# Patient Record
Sex: FEMALE | Race: WHITE | HISPANIC OR LATINO | URBAN - METROPOLITAN AREA
[De-identification: names, ages, dates, MRNs, and addresses within clinical notes are randomized per-mention and may not be internally consistent; named-entity substitution may affect disease eponyms.]

---

## 2019-01-06 ENCOUNTER — EMERGENCY (EMERGENCY)
Facility: HOSPITAL | Age: 44
LOS: 1 days | Discharge: HOME | End: 2019-01-06
Admitting: EMERGENCY MEDICINE
Payer: MEDICAID

## 2019-01-06 VITALS
DIASTOLIC BLOOD PRESSURE: 85 MMHG | RESPIRATION RATE: 17 BRPM | OXYGEN SATURATION: 98 % | WEIGHT: 179.9 LBS | TEMPERATURE: 98 F | HEART RATE: 67 BPM | SYSTOLIC BLOOD PRESSURE: 150 MMHG

## 2019-01-06 DIAGNOSIS — J44.9 CHRONIC OBSTRUCTIVE PULMONARY DISEASE, UNSPECIFIED: ICD-10-CM

## 2019-01-06 DIAGNOSIS — R10.9 UNSPECIFIED ABDOMINAL PAIN: ICD-10-CM

## 2019-01-06 DIAGNOSIS — N20.0 CALCULUS OF KIDNEY: ICD-10-CM

## 2019-01-06 DIAGNOSIS — Z98.84 BARIATRIC SURGERY STATUS: Chronic | ICD-10-CM

## 2019-01-06 LAB
ALBUMIN SERPL ELPH-MCNC: 3.9 G/DL — SIGNIFICANT CHANGE UP (ref 3.4–5)
ALP SERPL-CCNC: 89 U/L — SIGNIFICANT CHANGE UP (ref 40–120)
ALT FLD-CCNC: 22 U/L — SIGNIFICANT CHANGE UP (ref 12–42)
ANION GAP SERPL CALC-SCNC: 11 MMOL/L — SIGNIFICANT CHANGE UP (ref 9–16)
APPEARANCE UR: CLEAR — SIGNIFICANT CHANGE UP
AST SERPL-CCNC: 21 U/L — SIGNIFICANT CHANGE UP (ref 15–37)
BILIRUB SERPL-MCNC: 0.4 MG/DL — SIGNIFICANT CHANGE UP (ref 0.2–1.2)
BILIRUB UR-MCNC: NEGATIVE — SIGNIFICANT CHANGE UP
BUN SERPL-MCNC: 13 MG/DL — SIGNIFICANT CHANGE UP (ref 7–23)
CALCIUM SERPL-MCNC: 9.1 MG/DL — SIGNIFICANT CHANGE UP (ref 8.5–10.5)
CHLORIDE SERPL-SCNC: 104 MMOL/L — SIGNIFICANT CHANGE UP (ref 96–108)
CO2 SERPL-SCNC: 24 MMOL/L — SIGNIFICANT CHANGE UP (ref 22–31)
COLOR SPEC: YELLOW — SIGNIFICANT CHANGE UP
CREAT SERPL-MCNC: 0.78 MG/DL — SIGNIFICANT CHANGE UP (ref 0.5–1.3)
DIFF PNL FLD: ABNORMAL
GLUCOSE SERPL-MCNC: 120 MG/DL — HIGH (ref 70–99)
GLUCOSE UR QL: NEGATIVE — SIGNIFICANT CHANGE UP
HCG UR QL: NEGATIVE — SIGNIFICANT CHANGE UP
HCT VFR BLD CALC: 39 % — SIGNIFICANT CHANGE UP (ref 34.5–45)
HGB BLD-MCNC: 13.7 G/DL — SIGNIFICANT CHANGE UP (ref 11.5–15.5)
KETONES UR-MCNC: ABNORMAL MG/DL
LEUKOCYTE ESTERASE UR-ACNC: ABNORMAL
LIDOCAIN IGE QN: 83 U/L — SIGNIFICANT CHANGE UP (ref 73–393)
MCHC RBC-ENTMCNC: 30.4 PG — SIGNIFICANT CHANGE UP (ref 27–34)
MCHC RBC-ENTMCNC: 35.1 G/DL — SIGNIFICANT CHANGE UP (ref 32–36)
MCV RBC AUTO: 86.5 FL — SIGNIFICANT CHANGE UP (ref 80–100)
NITRITE UR-MCNC: NEGATIVE — SIGNIFICANT CHANGE UP
PH UR: 8 — SIGNIFICANT CHANGE UP (ref 5–8)
PLATELET # BLD AUTO: 410 K/UL — HIGH (ref 150–400)
POTASSIUM SERPL-MCNC: 3.9 MMOL/L — SIGNIFICANT CHANGE UP (ref 3.5–5.3)
POTASSIUM SERPL-SCNC: 3.9 MMOL/L — SIGNIFICANT CHANGE UP (ref 3.5–5.3)
PROT SERPL-MCNC: 7.9 G/DL — SIGNIFICANT CHANGE UP (ref 6.4–8.2)
PROT UR-MCNC: 30 MG/DL
RBC # BLD: 4.51 M/UL — SIGNIFICANT CHANGE UP (ref 3.8–5.2)
RBC # FLD: 12 % — SIGNIFICANT CHANGE UP (ref 10.3–14.5)
SODIUM SERPL-SCNC: 139 MMOL/L — SIGNIFICANT CHANGE UP (ref 132–145)
SP GR SPEC: 1.02 — SIGNIFICANT CHANGE UP (ref 1–1.03)
UROBILINOGEN FLD QL: 1 E.U./DL — SIGNIFICANT CHANGE UP
WBC # BLD: 14.6 K/UL — HIGH (ref 3.8–10.5)
WBC # FLD AUTO: 14.6 K/UL — HIGH (ref 3.8–10.5)

## 2019-01-06 PROCEDURE — 99285 EMERGENCY DEPT VISIT HI MDM: CPT

## 2019-01-06 PROCEDURE — 74176 CT ABD & PELVIS W/O CONTRAST: CPT | Mod: 26

## 2019-01-06 RX ORDER — OXYCODONE AND ACETAMINOPHEN 5; 325 MG/1; MG/1
1 TABLET ORAL ONCE
Qty: 0 | Refills: 0 | Status: COMPLETED | OUTPATIENT
Start: 2019-01-06 | End: 2019-01-06

## 2019-01-06 RX ORDER — IBUPROFEN 200 MG
1 TABLET ORAL
Qty: 20 | Refills: 0 | OUTPATIENT
Start: 2019-01-06 | End: 2019-01-10

## 2019-01-06 RX ORDER — SODIUM CHLORIDE 9 MG/ML
1000 INJECTION INTRAMUSCULAR; INTRAVENOUS; SUBCUTANEOUS ONCE
Qty: 0 | Refills: 0 | Status: COMPLETED | OUTPATIENT
Start: 2019-01-06 | End: 2019-01-06

## 2019-01-06 RX ORDER — ONDANSETRON 8 MG/1
4 TABLET, FILM COATED ORAL ONCE
Qty: 0 | Refills: 0 | Status: COMPLETED | OUTPATIENT
Start: 2019-01-06 | End: 2019-01-06

## 2019-01-06 RX ORDER — KETOROLAC TROMETHAMINE 30 MG/ML
30 SYRINGE (ML) INJECTION ONCE
Qty: 0 | Refills: 0 | Status: DISCONTINUED | OUTPATIENT
Start: 2019-01-06 | End: 2019-01-06

## 2019-01-06 RX ORDER — MORPHINE SULFATE 50 MG/1
4 CAPSULE, EXTENDED RELEASE ORAL ONCE
Qty: 0 | Refills: 0 | Status: DISCONTINUED | OUTPATIENT
Start: 2019-01-06 | End: 2019-01-06

## 2019-01-06 RX ORDER — TAMSULOSIN HYDROCHLORIDE 0.4 MG/1
1 CAPSULE ORAL
Qty: 14 | Refills: 0 | OUTPATIENT
Start: 2019-01-06 | End: 2019-01-19

## 2019-01-06 RX ORDER — SODIUM CHLORIDE 9 MG/ML
3 INJECTION INTRAMUSCULAR; INTRAVENOUS; SUBCUTANEOUS ONCE
Qty: 0 | Refills: 0 | Status: COMPLETED | OUTPATIENT
Start: 2019-01-06 | End: 2019-01-06

## 2019-01-06 RX ORDER — ONDANSETRON 8 MG/1
1 TABLET, FILM COATED ORAL
Qty: 12 | Refills: 0 | OUTPATIENT
Start: 2019-01-06

## 2019-01-06 RX ORDER — TAMSULOSIN HYDROCHLORIDE 0.4 MG/1
0.4 CAPSULE ORAL ONCE
Qty: 0 | Refills: 0 | Status: COMPLETED | OUTPATIENT
Start: 2019-01-06 | End: 2019-01-06

## 2019-01-06 RX ADMIN — MORPHINE SULFATE 4 MILLIGRAM(S): 50 CAPSULE, EXTENDED RELEASE ORAL at 17:54

## 2019-01-06 RX ADMIN — SODIUM CHLORIDE 3 MILLILITER(S): 9 INJECTION INTRAMUSCULAR; INTRAVENOUS; SUBCUTANEOUS at 17:40

## 2019-01-06 RX ADMIN — SODIUM CHLORIDE 1000 MILLILITER(S): 9 INJECTION INTRAMUSCULAR; INTRAVENOUS; SUBCUTANEOUS at 17:45

## 2019-01-06 RX ADMIN — Medication 30 MILLIGRAM(S): at 17:45

## 2019-01-06 RX ADMIN — ONDANSETRON 4 MILLIGRAM(S): 8 TABLET, FILM COATED ORAL at 17:45

## 2019-01-06 RX ADMIN — TAMSULOSIN HYDROCHLORIDE 0.4 MILLIGRAM(S): 0.4 CAPSULE ORAL at 19:35

## 2019-01-06 NOTE — ED ADULT NURSE NOTE - OBJECTIVE STATEMENT
c/o sudden onset right sided flank pain while at restaurant, + radiation to RLQ, + n/v. Pt states hx of similar r/t kidnet stones, last episode approx 8 years ago> pt upgraded r/t pain, brought directly to room 7. Provider at bedside, medicated as ordered, labs sent. Multiple family members at bedside, will f/u as indicated. c/o sudden onset right sided flank pain while at restaurant, + radiation to RLQ, + n/v. Pt states hx of similar r/t kidney stones, last episode approx 8 years ago> pt upgraded r/t pain, brought directly to room 7. Provider at bedside, medicated as ordered, labs sent. Multiple family members at bedside, will f/u as indicated.

## 2019-01-06 NOTE — ED PROVIDER NOTE - MEDICAL DECISION MAKING DETAILS
44 y/o F presents to ED c/o R flank pain.  Pt appears uncomfortable.  VSS. NAD.  CT shows 4 mm stone with mild R hydronephrosis.  Nl creatinine, UA negative for infection, culture sent.  Will refer to urology.   Rx for flomax, Percocet, ibuprofen, Zofran and given urine strainers.  Strict return precautions advised.

## 2019-01-06 NOTE — ED PROVIDER NOTE - ENMT, MLM
Normocephalic, atraumatic. Moist mucous membranes. Airway patent, Nasal mucosa clear. Mouth with normal mucosa. Throat has no vesicles, no oropharyngeal exudates and uvula is midline.

## 2019-01-06 NOTE — ED ADULT NURSE NOTE - NSIMPLEMENTINTERV_GEN_ALL_ED
Implemented All Universal Safety Interventions:  [place] to call system. Call bell, personal items and telephone within reach. Instruct patient to call for assistance. Room bathroom lighting operational. Non-slip footwear when patient is off stretcher. Physically safe environment: no spills, clutter or unnecessary equipment. Stretcher in lowest position, wheels locked, appropriate side rails in place.

## 2019-01-06 NOTE — ED ADULT NURSE NOTE - CHPI ED NUR SYMPTOMS NEG
no burning urination/no chills/no diarrhea/no fever/no blood in stool/no abdominal distension/no dysuria

## 2019-01-06 NOTE — ED PROVIDER NOTE - NSFOLLOWUPINSTRUCTIONS_ED_ALL_ED_FT
Take Flomax daily.  Take Ibuprofen asneeded with food for moderate pain.  Take Percocet as needed with food for severe pain.  This medication is sedating.  Do not drive or drink alcohol with this medication.  Hydrate well.  Follow up with urology.   Return for fever, vomiting, intractable pain, inability to urinate or other concerns.

## 2019-01-06 NOTE — ED PROVIDER NOTE - OBJECTIVE STATEMENT
42 y/o F with PMHx of kidney stones and COPD, PSHx of gastric bypass (April 2018) presents to the ED c/o sudden onset R flank pain with radiation to the R lower abdomen associated nausea x 1 hr. States pain is similar to kidney stone pain in the past and has never required lithotripsy or a ureteral stent. Last kidney stone occurred 8 yrs ago. Pt denies fevers, chills, vomiting, dysuria, hematuria, CP, SOB and chance of pregnancy.

## 2019-01-06 NOTE — ED ADULT TRIAGE NOTE - CHIEF COMPLAINT QUOTE
here for acute right sided flank pain radiating to right lower abdomen- denies urinary symptoms- Pt with h/o kidney stones

## 2019-01-06 NOTE — ED PROVIDER NOTE - CARE PROVIDER_API CALL
Patrick Rosa), Urology  [address] [place] [address], [place] [id number]  Phone: [phone]  Fax: [phone]

## 2019-01-13 ENCOUNTER — INPATIENT (INPATIENT)
Facility: HOSPITAL | Age: 44
LOS: 0 days | Discharge: HOME | DRG: 694 | End: 2019-01-14
Attending: UROLOGY | Admitting: UROLOGY
Payer: COMMERCIAL

## 2019-01-13 VITALS
OXYGEN SATURATION: 98 % | RESPIRATION RATE: 17 BRPM | HEART RATE: 73 BPM | DIASTOLIC BLOOD PRESSURE: 86 MMHG | TEMPERATURE: 98 F | SYSTOLIC BLOOD PRESSURE: 145 MMHG

## 2019-01-13 DIAGNOSIS — N20.0 CALCULUS OF KIDNEY: ICD-10-CM

## 2019-01-13 DIAGNOSIS — Z98.84 BARIATRIC SURGERY STATUS: Chronic | ICD-10-CM

## 2019-01-13 PROBLEM — J44.9 CHRONIC OBSTRUCTIVE PULMONARY DISEASE, UNSPECIFIED: Chronic | Status: ACTIVE | Noted: 2019-01-06

## 2019-01-13 LAB
ALBUMIN SERPL ELPH-MCNC: 3.8 G/DL — SIGNIFICANT CHANGE UP (ref 3.4–5)
ALP SERPL-CCNC: 77 U/L — SIGNIFICANT CHANGE UP (ref 40–120)
ALT FLD-CCNC: 20 U/L — SIGNIFICANT CHANGE UP (ref 12–42)
ANION GAP SERPL CALC-SCNC: 12 MMOL/L — SIGNIFICANT CHANGE UP (ref 9–16)
APPEARANCE UR: CLEAR — SIGNIFICANT CHANGE UP
AST SERPL-CCNC: 18 U/L — SIGNIFICANT CHANGE UP (ref 15–37)
BASOPHILS NFR BLD AUTO: 0.4 % — SIGNIFICANT CHANGE UP (ref 0–2)
BILIRUB SERPL-MCNC: 0.2 MG/DL — SIGNIFICANT CHANGE UP (ref 0.2–1.2)
BILIRUB UR-MCNC: NEGATIVE — SIGNIFICANT CHANGE UP
BLD GP AB SCN SERPL QL: NEGATIVE — SIGNIFICANT CHANGE UP
BUN SERPL-MCNC: 13 MG/DL — SIGNIFICANT CHANGE UP (ref 7–23)
CALCIUM SERPL-MCNC: 8.8 MG/DL — SIGNIFICANT CHANGE UP (ref 8.5–10.5)
CHLORIDE SERPL-SCNC: 104 MMOL/L — SIGNIFICANT CHANGE UP (ref 96–108)
CO2 SERPL-SCNC: 25 MMOL/L — SIGNIFICANT CHANGE UP (ref 22–31)
COLOR SPEC: YELLOW — SIGNIFICANT CHANGE UP
CREAT SERPL-MCNC: 0.91 MG/DL — SIGNIFICANT CHANGE UP (ref 0.5–1.3)
DIFF PNL FLD: NEGATIVE — SIGNIFICANT CHANGE UP
EOSINOPHIL NFR BLD AUTO: 2.8 % — SIGNIFICANT CHANGE UP (ref 0–6)
GLUCOSE SERPL-MCNC: 114 MG/DL — HIGH (ref 70–99)
GLUCOSE UR QL: NEGATIVE — SIGNIFICANT CHANGE UP
HCT VFR BLD CALC: 37.1 % — SIGNIFICANT CHANGE UP (ref 34.5–45)
HGB BLD-MCNC: 12.7 G/DL — SIGNIFICANT CHANGE UP (ref 11.5–15.5)
IMM GRANULOCYTES NFR BLD AUTO: 0.4 % — SIGNIFICANT CHANGE UP (ref 0–1.5)
KETONES UR-MCNC: NEGATIVE — SIGNIFICANT CHANGE UP
LEUKOCYTE ESTERASE UR-ACNC: ABNORMAL
LYMPHOCYTES # BLD AUTO: 10.8 % — LOW (ref 13–44)
MCHC RBC-ENTMCNC: 30.1 PG — SIGNIFICANT CHANGE UP (ref 27–34)
MCHC RBC-ENTMCNC: 34.2 G/DL — SIGNIFICANT CHANGE UP (ref 32–36)
MCV RBC AUTO: 87.9 FL — SIGNIFICANT CHANGE UP (ref 80–100)
MONOCYTES NFR BLD AUTO: 6.8 % — SIGNIFICANT CHANGE UP (ref 2–14)
NEUTROPHILS NFR BLD AUTO: 78.8 % — HIGH (ref 43–77)
NITRITE UR-MCNC: NEGATIVE — SIGNIFICANT CHANGE UP
PH UR: 6 — SIGNIFICANT CHANGE UP (ref 5–8)
PLATELET # BLD AUTO: 381 K/UL — SIGNIFICANT CHANGE UP (ref 150–400)
POTASSIUM SERPL-MCNC: 3.5 MMOL/L — SIGNIFICANT CHANGE UP (ref 3.5–5.3)
POTASSIUM SERPL-SCNC: 3.5 MMOL/L — SIGNIFICANT CHANGE UP (ref 3.5–5.3)
PROT SERPL-MCNC: 7.8 G/DL — SIGNIFICANT CHANGE UP (ref 6.4–8.2)
PROT UR-MCNC: NEGATIVE MG/DL — SIGNIFICANT CHANGE UP
RBC # BLD: 4.22 M/UL — SIGNIFICANT CHANGE UP (ref 3.8–5.2)
RBC # FLD: 12.3 % — SIGNIFICANT CHANGE UP (ref 10.3–14.5)
RH IG SCN BLD-IMP: POSITIVE — SIGNIFICANT CHANGE UP
SODIUM SERPL-SCNC: 141 MMOL/L — SIGNIFICANT CHANGE UP (ref 132–145)
SP GR SPEC: 1.02 — SIGNIFICANT CHANGE UP (ref 1–1.03)
UROBILINOGEN FLD QL: 1 E.U./DL — SIGNIFICANT CHANGE UP
WBC # BLD: 16.5 K/UL — HIGH (ref 3.8–10.5)
WBC # FLD AUTO: 16.5 K/UL — HIGH (ref 3.8–10.5)

## 2019-01-13 PROCEDURE — 71045 X-RAY EXAM CHEST 1 VIEW: CPT | Mod: 26

## 2019-01-13 PROCEDURE — 99285 EMERGENCY DEPT VISIT HI MDM: CPT | Mod: 25

## 2019-01-13 PROCEDURE — 76770 US EXAM ABDO BACK WALL COMP: CPT | Mod: 26

## 2019-01-13 RX ORDER — SODIUM CHLORIDE 9 MG/ML
1000 INJECTION INTRAMUSCULAR; INTRAVENOUS; SUBCUTANEOUS ONCE
Qty: 0 | Refills: 0 | Status: COMPLETED | OUTPATIENT
Start: 2019-01-13 | End: 2019-01-13

## 2019-01-13 RX ORDER — OXYCODONE AND ACETAMINOPHEN 5; 325 MG/1; MG/1
1 TABLET ORAL EVERY 4 HOURS
Qty: 0 | Refills: 0 | Status: DISCONTINUED | OUTPATIENT
Start: 2019-01-13 | End: 2019-01-14

## 2019-01-13 RX ORDER — MORPHINE SULFATE 50 MG/1
2 CAPSULE, EXTENDED RELEASE ORAL EVERY 4 HOURS
Qty: 0 | Refills: 0 | Status: DISCONTINUED | OUTPATIENT
Start: 2019-01-13 | End: 2019-01-14

## 2019-01-13 RX ORDER — TIOTROPIUM BROMIDE 18 UG/1
2.5 CAPSULE ORAL; RESPIRATORY (INHALATION)
Qty: 0 | Refills: 0 | COMMUNITY

## 2019-01-13 RX ORDER — BUDESONIDE AND FORMOTEROL FUMARATE DIHYDRATE 160; 4.5 UG/1; UG/1
2 AEROSOL RESPIRATORY (INHALATION)
Qty: 0 | Refills: 0 | COMMUNITY

## 2019-01-13 RX ORDER — ASCORBIC ACID 60 MG
1 TABLET,CHEWABLE ORAL
Qty: 0 | Refills: 0 | COMMUNITY

## 2019-01-13 RX ORDER — KETOROLAC TROMETHAMINE 30 MG/ML
30 SYRINGE (ML) INJECTION ONCE
Qty: 0 | Refills: 0 | Status: DISCONTINUED | OUTPATIENT
Start: 2019-01-13 | End: 2019-01-13

## 2019-01-13 RX ORDER — ASCORBIC ACID 60 MG
500 TABLET,CHEWABLE ORAL DAILY
Qty: 0 | Refills: 0 | Status: DISCONTINUED | OUTPATIENT
Start: 2019-01-13 | End: 2019-01-14

## 2019-01-13 RX ORDER — SODIUM CHLORIDE 9 MG/ML
1000 INJECTION INTRAMUSCULAR; INTRAVENOUS; SUBCUTANEOUS
Qty: 0 | Refills: 0 | Status: DISCONTINUED | OUTPATIENT
Start: 2019-01-13 | End: 2019-01-14

## 2019-01-13 RX ORDER — KETOROLAC TROMETHAMINE 30 MG/ML
30 SYRINGE (ML) INJECTION EVERY 8 HOURS
Qty: 0 | Refills: 0 | Status: DISCONTINUED | OUTPATIENT
Start: 2019-01-13 | End: 2019-01-14

## 2019-01-13 RX ORDER — MORPHINE SULFATE 50 MG/1
4 CAPSULE, EXTENDED RELEASE ORAL ONCE
Qty: 0 | Refills: 0 | Status: DISCONTINUED | OUTPATIENT
Start: 2019-01-13 | End: 2019-01-13

## 2019-01-13 RX ORDER — ONDANSETRON 8 MG/1
4 TABLET, FILM COATED ORAL ONCE
Qty: 0 | Refills: 0 | Status: COMPLETED | OUTPATIENT
Start: 2019-01-13 | End: 2019-01-13

## 2019-01-13 RX ORDER — OXYCODONE AND ACETAMINOPHEN 5; 325 MG/1; MG/1
2 TABLET ORAL EVERY 6 HOURS
Qty: 0 | Refills: 0 | Status: DISCONTINUED | OUTPATIENT
Start: 2019-01-13 | End: 2019-01-14

## 2019-01-13 RX ORDER — BUDESONIDE AND FORMOTEROL FUMARATE DIHYDRATE 160; 4.5 UG/1; UG/1
2 AEROSOL RESPIRATORY (INHALATION)
Qty: 0 | Refills: 0 | Status: DISCONTINUED | OUTPATIENT
Start: 2019-01-13 | End: 2019-01-14

## 2019-01-13 RX ORDER — KETOROLAC TROMETHAMINE 30 MG/ML
30 SYRINGE (ML) INJECTION EVERY 8 HOURS
Qty: 0 | Refills: 0 | Status: DISCONTINUED | OUTPATIENT
Start: 2019-01-13 | End: 2019-01-13

## 2019-01-13 RX ORDER — TIOTROPIUM BROMIDE 18 UG/1
1 CAPSULE ORAL; RESPIRATORY (INHALATION) AT BEDTIME
Qty: 0 | Refills: 0 | Status: DISCONTINUED | OUTPATIENT
Start: 2019-01-13 | End: 2019-01-14

## 2019-01-13 RX ORDER — TAMSULOSIN HYDROCHLORIDE 0.4 MG/1
0.4 CAPSULE ORAL AT BEDTIME
Qty: 0 | Refills: 0 | Status: DISCONTINUED | OUTPATIENT
Start: 2019-01-13 | End: 2019-01-14

## 2019-01-13 RX ORDER — CEFTRIAXONE 500 MG/1
1 INJECTION, POWDER, FOR SOLUTION INTRAMUSCULAR; INTRAVENOUS ONCE
Qty: 0 | Refills: 0 | Status: COMPLETED | OUTPATIENT
Start: 2019-01-13 | End: 2019-01-13

## 2019-01-13 RX ORDER — ALBUTEROL 90 UG/1
1 AEROSOL, METERED ORAL EVERY 6 HOURS
Qty: 0 | Refills: 0 | Status: DISCONTINUED | OUTPATIENT
Start: 2019-01-13 | End: 2019-01-14

## 2019-01-13 RX ADMIN — MORPHINE SULFATE 4 MILLIGRAM(S): 50 CAPSULE, EXTENDED RELEASE ORAL at 04:45

## 2019-01-13 RX ADMIN — MORPHINE SULFATE 4 MILLIGRAM(S): 50 CAPSULE, EXTENDED RELEASE ORAL at 10:49

## 2019-01-13 RX ADMIN — BUDESONIDE AND FORMOTEROL FUMARATE DIHYDRATE 2 PUFF(S): 160; 4.5 AEROSOL RESPIRATORY (INHALATION) at 17:57

## 2019-01-13 RX ADMIN — OXYCODONE AND ACETAMINOPHEN 1 TABLET(S): 5; 325 TABLET ORAL at 18:56

## 2019-01-13 RX ADMIN — Medication 30 MILLIGRAM(S): at 10:48

## 2019-01-13 RX ADMIN — Medication 30 MILLIGRAM(S): at 05:33

## 2019-01-13 RX ADMIN — CEFTRIAXONE 100 GRAM(S): 500 INJECTION, POWDER, FOR SOLUTION INTRAMUSCULAR; INTRAVENOUS at 09:41

## 2019-01-13 RX ADMIN — MORPHINE SULFATE 4 MILLIGRAM(S): 50 CAPSULE, EXTENDED RELEASE ORAL at 04:29

## 2019-01-13 RX ADMIN — ONDANSETRON 4 MILLIGRAM(S): 8 TABLET, FILM COATED ORAL at 04:29

## 2019-01-13 RX ADMIN — SODIUM CHLORIDE 1000 MILLILITER(S): 9 INJECTION INTRAMUSCULAR; INTRAVENOUS; SUBCUTANEOUS at 04:29

## 2019-01-13 RX ADMIN — TAMSULOSIN HYDROCHLORIDE 0.4 MILLIGRAM(S): 0.4 CAPSULE ORAL at 21:07

## 2019-01-13 RX ADMIN — TIOTROPIUM BROMIDE 1 CAPSULE(S): 18 CAPSULE ORAL; RESPIRATORY (INHALATION) at 21:08

## 2019-01-13 RX ADMIN — OXYCODONE AND ACETAMINOPHEN 1 TABLET(S): 5; 325 TABLET ORAL at 17:56

## 2019-01-13 NOTE — ED ADULT TRIAGE NOTE - CHIEF COMPLAINT QUOTE
ambulatory, complaining of right sided flank pain. States she was here a week ago and was diagnosed with kidney stones. Pt vomited on arrival.

## 2019-01-13 NOTE — H&P ADULT - NSHPPHYSICALEXAM_GEN_ALL_CORE
PE  Gen:  Abd:  : PE  Gen: awake and alert, NAD  Abd: soft, nontender, nondistended, R CVA tenderness  : voiding well

## 2019-01-13 NOTE — ED PROVIDER NOTE - OBJECTIVE STATEMENT
42 y/o F presents to returns to ED c/o worsening R flank pain.  Pt seen in ED 6 days ago and diagnosed with 6 mm R ureteral stone.  Pt discharged with Percocet, Flomax and Zofran.  She states she has been trying to manage the pain but it has increased within the past 24 hours.  She denies fevers/chills, abd pain, n/v/d, chest pain, SOB.

## 2019-01-13 NOTE — ED ADULT NURSE NOTE - OBJECTIVE STATEMENT
pt aox4. neurologically wnl. no sob or difficulty breathing noted. lung sounds clear bilaterally. skin appropriate for ethnicity, warm and dry. cap refill < 2 secs. pulses 2+ and regular. abd rounded soft and nontender. pt c/o R flank pain. pt with recent diagnosis of R kidney stone. pt states that pain has increased and gotten worse.

## 2019-01-13 NOTE — H&P ADULT - PROBLEM SELECTOR PLAN 1
-Admit to urology  -IVF  -Preop for OR tomorrow  -Pain control  -Strain urine  -npo after mid  -discussed with urology team

## 2019-01-13 NOTE — ED PROVIDER NOTE - PROGRESS NOTE DETAILS
urology paged, awaiting call back Pt discussed with urology Aminta CONTI.  Advised Toradol IV and if pt is still very uncomfortable, will call urology attending directly for admission. The patient understands Emergency Department diagnosis is a preliminary diagnosis often based on limited information and that the patient must adhere to the follow-up plan as discussed.  The patient understands that if the symptoms worsen or if prescribed medications do not have the desired/planned effect that the patient may return to the Emergency Department at any time for further evaluation and treatment.

## 2019-01-13 NOTE — H&P ADULT - HISTORY OF PRESENT ILLNESS
Pt is a 44 yo female with PMH significant for COPD, kidney stones.  She presented to [facility] overnight with worsening pain.  She was previously seen in the ED 6 days ago with pain and was found to have a 6mm R ureteral stone and was sent home with percocet, flomax and zofran.  She denies fevers, chills, nausea, vomiting or diarrhea.  She was transferred to [facility] for pain control. Pt is a 42 yo female with PMH significant for COPD, kidney stones.  She presented to [facility] overnight with worsening pain.  She was previously seen in the ED 6 days ago with pain and was found to have a 6mm R ureteral stone and was sent home with percocet, flomax and zofran.  She denies fevers, chills, nausea, vomiting or diarrhea.  No dysuria, no hematuria, no increased urinary frequency.  She was transferred to [facility] for pain control.

## 2019-01-13 NOTE — ED ADULT NURSE NOTE - NSIMPLEMENTINTERV_GEN_ALL_ED
Implemented All Universal Safety Interventions:  [place] to call system. Call bell, personal items and telephone within reach. Instruct patient to call for assistance. Room bathroom lighting operational. Non-slip footwear when patient is off stretcher. Physically safe environment: no spills, clutter or unnecessary equipment. Stretcher in lowest position, wheels locked, appropriate side rails in place.

## 2019-01-13 NOTE — ED PROVIDER NOTE - MEDICAL DECISION MAKING DETAILS
44 y/o F with known proximal R ureteral stone on CT 6 days ago, presents to ED c/o intractable pain.  VSS, afebrile.  Labs wnl.  Nl creatinine.  No infection on UA.

## 2019-01-13 NOTE — ED PROVIDER NOTE - CONDUCTED A DETAILED DISCUSSION WITH PATIENT AND/OR GUARDIAN REGARDING, MDM
need for outpatient follow-up/return to ED if symptoms worsen, persist or questions arise need for outpatient follow-up/return to ED if symptoms worsen, persist or questions arise/lab results

## 2019-01-13 NOTE — ED PROVIDER NOTE - ATTENDING CONTRIBUTION TO CARE
Patient presenting with wrose R flank pain. Know 4mm prox ureter stone. Dx'd here last week. VSS. ++ pian. + R CVAT. Will send labs and UA. May need admission.

## 2019-01-13 NOTE — H&P ADULT - ASSESSMENT
Pt is a 42 yo female transferred from [facility] with 6mm R proximal ureteral stone.  Vital signs stable, Afebrile. She is schedule for OR tomorrow 1/14 for cysto, R urs, R stent placement.

## 2019-01-14 ENCOUNTER — TRANSCRIPTION ENCOUNTER (OUTPATIENT)
Age: 44
End: 2019-01-14

## 2019-01-14 VITALS
OXYGEN SATURATION: 96 % | DIASTOLIC BLOOD PRESSURE: 71 MMHG | TEMPERATURE: 99 F | HEART RATE: 72 BPM | SYSTOLIC BLOOD PRESSURE: 143 MMHG | RESPIRATION RATE: 18 BRPM

## 2019-01-14 LAB
ANION GAP SERPL CALC-SCNC: 13 MMOL/L — SIGNIFICANT CHANGE UP (ref 5–17)
APTT BLD: 31.7 SEC — SIGNIFICANT CHANGE UP (ref 27.5–36.3)
BASOPHILS NFR BLD AUTO: 0.4 % — SIGNIFICANT CHANGE UP (ref 0–2)
BUN SERPL-MCNC: 10 MG/DL — SIGNIFICANT CHANGE UP (ref 7–23)
CALCIUM SERPL-MCNC: 8.4 MG/DL — SIGNIFICANT CHANGE UP (ref 8.4–10.5)
CHLORIDE SERPL-SCNC: 101 MMOL/L — SIGNIFICANT CHANGE UP (ref 96–108)
CO2 SERPL-SCNC: 23 MMOL/L — SIGNIFICANT CHANGE UP (ref 22–31)
CREAT SERPL-MCNC: 0.61 MG/DL — SIGNIFICANT CHANGE UP (ref 0.5–1.3)
EOSINOPHIL NFR BLD AUTO: 5.5 % — SIGNIFICANT CHANGE UP (ref 0–6)
GLUCOSE SERPL-MCNC: 86 MG/DL — SIGNIFICANT CHANGE UP (ref 70–99)
HCG SERPL-ACNC: <.1 MIU/ML — SIGNIFICANT CHANGE UP
HCT VFR BLD CALC: 36.1 % — SIGNIFICANT CHANGE UP (ref 34.5–45)
HGB BLD-MCNC: 12.5 G/DL — SIGNIFICANT CHANGE UP (ref 11.5–15.5)
INR BLD: 1.02 — SIGNIFICANT CHANGE UP (ref 0.88–1.16)
LYMPHOCYTES # BLD AUTO: 23.2 % — SIGNIFICANT CHANGE UP (ref 13–44)
MAGNESIUM SERPL-MCNC: 1.8 MG/DL — SIGNIFICANT CHANGE UP (ref 1.6–2.6)
MCHC RBC-ENTMCNC: 29.9 PG — SIGNIFICANT CHANGE UP (ref 27–34)
MCHC RBC-ENTMCNC: 34.6 G/DL — SIGNIFICANT CHANGE UP (ref 32–36)
MCV RBC AUTO: 86.4 FL — SIGNIFICANT CHANGE UP (ref 80–100)
MONOCYTES NFR BLD AUTO: 6.9 % — SIGNIFICANT CHANGE UP (ref 2–14)
NEUTROPHILS NFR BLD AUTO: 64 % — SIGNIFICANT CHANGE UP (ref 43–77)
PHOSPHATE SERPL-MCNC: 3.2 MG/DL — SIGNIFICANT CHANGE UP (ref 2.5–4.5)
PLATELET # BLD AUTO: 298 K/UL — SIGNIFICANT CHANGE UP (ref 150–400)
POTASSIUM SERPL-MCNC: 3.6 MMOL/L — SIGNIFICANT CHANGE UP (ref 3.5–5.3)
POTASSIUM SERPL-SCNC: 3.6 MMOL/L — SIGNIFICANT CHANGE UP (ref 3.5–5.3)
PROTHROM AB SERPL-ACNC: 11.5 SEC — SIGNIFICANT CHANGE UP (ref 10–12.9)
RBC # BLD: 4.18 M/UL — SIGNIFICANT CHANGE UP (ref 3.8–5.2)
RBC # FLD: 12.3 % — SIGNIFICANT CHANGE UP (ref 10.3–16.9)
SODIUM SERPL-SCNC: 137 MMOL/L — SIGNIFICANT CHANGE UP (ref 135–145)
WBC # BLD: 7.5 K/UL — SIGNIFICANT CHANGE UP (ref 3.8–10.5)
WBC # FLD AUTO: 7.5 K/UL — SIGNIFICANT CHANGE UP (ref 3.8–10.5)

## 2019-01-14 PROCEDURE — 74018 RADEX ABDOMEN 1 VIEW: CPT | Mod: 26

## 2019-01-14 PROCEDURE — 76770 US EXAM ABDO BACK WALL COMP: CPT | Mod: 26

## 2019-01-14 RX ORDER — POTASSIUM CHLORIDE 20 MEQ
40 PACKET (EA) ORAL ONCE
Qty: 0 | Refills: 0 | Status: DISCONTINUED | OUTPATIENT
Start: 2019-01-14 | End: 2019-01-14

## 2019-01-14 RX ORDER — TAMSULOSIN HYDROCHLORIDE 0.4 MG/1
1 CAPSULE ORAL
Qty: 14 | Refills: 0 | OUTPATIENT
Start: 2019-01-14 | End: 2019-01-27

## 2019-01-14 RX ORDER — POTASSIUM CHLORIDE 20 MEQ
40 PACKET (EA) ORAL ONCE
Qty: 0 | Refills: 0 | Status: COMPLETED | OUTPATIENT
Start: 2019-01-14 | End: 2019-01-14

## 2019-01-14 RX ORDER — DOCUSATE SODIUM 100 MG
1 CAPSULE ORAL
Qty: 16 | Refills: 0 | OUTPATIENT
Start: 2019-01-14 | End: 2019-01-21

## 2019-01-14 RX ADMIN — BUDESONIDE AND FORMOTEROL FUMARATE DIHYDRATE 2 PUFF(S): 160; 4.5 AEROSOL RESPIRATORY (INHALATION) at 05:41

## 2019-01-14 RX ADMIN — SODIUM CHLORIDE 125 MILLILITER(S): 9 INJECTION INTRAMUSCULAR; INTRAVENOUS; SUBCUTANEOUS at 05:42

## 2019-01-14 NOTE — PROGRESS NOTE ADULT - SUBJECTIVE AND OBJECTIVE BOX
Pre-op Diagnosis: R ureteral stone  Procedure: cystoscopy, R URS, laser lithotripsy, R stent  Surgeon: Dr. Womack    Consent in chart                          12.7   16.5  )-----------( 381      ( 2019 04:18 )             37.1         141  |  104  |  13  ----------------------------<  114<H>  3.5   |  25  |  0.91    Ca    8.8      2019 04:18    TPro  7.8  /  Alb  3.8  /  TBili  0.2  /  DBili  x   /  AST  18  /  ALT  20  /  AlkPhos  77        Urinalysis Basic - ( 2019 04:23 )    Color: Yellow / Appearance: Clear / S.025 / pH: x  Gluc: x / Ketone: NEGATIVE  / Bili: NEGATIVE / Urobili: 1.0 E.U./dL   Blood: x / Protein: NEGATIVE mg/dL / Nitrite: NEGATIVE   Leuk Esterase: Trace / RBC: 5-10 /HPF / WBC 5-10 /HPF   Sq Epi: x / Non Sq Epi: Moderate /HPF / Bacteria: Present /HPF        Type & Screen: done  CXR: done   EKG: done          A/P: 43yFemale planned for above procedure  1. NPO past midnight, except medications  2. IVFascorbic acid 500 milliGRAM(s) Oral daily  sodium chloride 0.9%. 1000 milliLiter(s) IV Continuous <Continuous>    3. [ ] Blood on hold, Units:

## 2019-01-14 NOTE — DISCHARGE NOTE ADULT - CARE PLAN
Principal Discharge DX:	Nephrolithiasis  Goal:	improvement after surgery  Assessment and plan of treatment:	regular diet, activity as tolerated, please stay well hydrated. Strain urine. If fever >100.4 or any change or worsening of symptoms please call doctor or report to ED. [facility]

## 2019-01-14 NOTE — DISCHARGE NOTE ADULT - HOSPITAL COURSE
43F hx COPD, 5mm right ureteral stone came to [facility] with flank pain. Pain subsided with IVF and pain control. Patient was scheduled for surgery 1/14 however she prefers to try again to pass the stone. She was instructed to return with any change or worsening of symptoms. Now AVSS, afebrile and hemodynamically stable/

## 2019-01-14 NOTE — DISCHARGE NOTE ADULT - MEDICATION SUMMARY - MEDICATIONS TO STOP TAKING
I will STOP taking the medications listed below when I get home from the hospital:    ondansetron 4 mg oral tablet  -- 1 tab(s) by mouth every 8 hours, As Needed -for nausea

## 2019-01-14 NOTE — DISCHARGE NOTE ADULT - MEDICATION SUMMARY - MEDICATIONS TO TAKE
I will START or STAY ON the medications listed below when I get home from the hospital:    Percocet 5/325 oral tablet  -- 1 tab(s) by mouth every 6 hours, As Needed -for severe pain MDD:4  -- Caution federal law prohibits the transfer of this drug to any person other  than the person for whom it was prescribed.  May cause drowsiness.  Alcohol may intensify this effect.  Use care when operating dangerous machinery.  This prescription cannot be refilled.  This product contains acetaminophen.  Do not use  with any other product containing acetaminophen to prevent possible liver damage.  Using more of this medication than prescribed may cause serious breathing problems.    -- Indication: For For pain    ibuprofen 600 mg oral tablet  -- 1 tab(s) by mouth every 6 hours, As Needed -for moderate pain   -- Do not take this drug if you are pregnant.  It is very important that you take or use this exactly as directed.  Do not skip doses or discontinue unless directed by your doctor.  May cause drowsiness or dizziness.  Obtain medical advice before taking any non-prescription drugs as some may affect the action of this medication.  Take with food or milk.    -- Indication: For For mild-moderate pain    Percocet 5/325 oral tablet  -- 1 tab(s) by mouth every 6 hours, As Needed -for severe pain MDD:4   -- Caution federal law prohibits the transfer of this drug to any person other  than the person for whom it was prescribed.  May cause drowsiness.  Alcohol may intensify this effect.  Use care when operating dangerous machinery.  This prescription cannot be refilled.  This product contains acetaminophen.  Do not use  with any other product containing acetaminophen to prevent possible liver damage.  Using more of this medication than prescribed may cause serious breathing problems.    -- Indication: For For severe pain    Flomax 0.4 mg oral capsule  -- 1 cap(s) by mouth once a day   -- It is very important that you take or use this exactly as directed.  Do not skip doses or discontinue unless directed by your doctor.  May cause drowsiness.  Alcohol may intensify this effect.  Use care when operating dangerous machinery.  Some non-prescription drugs may aggravate your condition.  Read all labels carefully.  If a warning appears, check with your doctor before taking.  Swallow whole.  Do not crush.  Take with food or milk.    -- Indication: For pass the kidney stone    Flomax 0.4 mg oral capsule  -- 1 cap(s) by mouth once a day   -- It is very important that you take or use this exactly as directed.  Do not skip doses or discontinue unless directed by your doctor.  May cause drowsiness.  Alcohol may intensify this effect.  Use care when operating dangerous machinery.  Some non-prescription drugs may aggravate your condition.  Read all labels carefully.  If a warning appears, check with your doctor before taking.  Swallow whole.  Do not crush.  Take with food or milk.    -- Indication: For pass the kidney stone    Spiriva Respimat  -- 2.5 microgram(s) inhaled once a day in the evening  -- Indication: For home med    Symbicort 160 mcg-4.5 mcg/inh inhalation aerosol  -- 2 puff(s) inhaled 2 times a day  -- Indication: For home med    Colace 100 mg oral capsule  -- 1 cap(s) by mouth 2 times a day, As Needed -for constipation   -- Medication should be taken with plenty of water.    -- Indication: For For constipation    Multi Vitamin+  -- 1 tab(s) by mouth daily  -- Indication: For home med    Vitamin C 100 mg oral tablet  -- 1 tab(s) by mouth once a day  -- Indication: For home med    biotin 1000 mcg oral tablet  -- 1 tab(s) by mouth 2 times a day  -- Indication: For home med

## 2019-01-14 NOTE — DISCHARGE NOTE ADULT - CARE PROVIDERS DIRECT ADDRESSES
[email or web address]

## 2019-01-14 NOTE — DISCHARGE NOTE ADULT - PLAN OF CARE
improvement after surgery regular diet, activity as tolerated, please stay well hydrated. Strain urine. If fever >100.4 or any change or worsening of symptoms please call doctor or report to ED. Please make a followup appointment with [facility] call office

## 2019-01-14 NOTE — DISCHARGE NOTE ADULT - PATIENT PORTAL LINK FT
You can access the [facility], offered by [facility], by registering with the following website: [email or web address]

## 2019-01-17 DIAGNOSIS — N20.0 CALCULUS OF KIDNEY: ICD-10-CM

## 2019-01-17 DIAGNOSIS — Z79.899 OTHER LONG TERM (CURRENT) DRUG THERAPY: ICD-10-CM

## 2019-01-17 DIAGNOSIS — Z98.84 BARIATRIC SURGERY STATUS: ICD-10-CM

## 2019-01-17 DIAGNOSIS — J44.9 CHRONIC OBSTRUCTIVE PULMONARY DISEASE, UNSPECIFIED: ICD-10-CM

## 2019-01-17 PROBLEM — Z00.00 ENCOUNTER FOR PREVENTIVE HEALTH EXAMINATION: Status: ACTIVE | Noted: 2019-01-17

## 2019-01-18 ENCOUNTER — INBOUND DOCUMENT (OUTPATIENT)
Age: 44
End: 2019-01-18

## 2019-03-18 NOTE — ED PROVIDER NOTE - NS ED MD DISPO ISOLATION TYPES
CVA (cerebral vascular accident)    Dyslipidemia    ETOH abuse    Gout    HTN (hypertension)    MI (myocardial infarction)    Personal history of asbestosis    UTI (lower urinary tract infection) None

## 2019-08-15 ENCOUNTER — EMERGENCY (EMERGENCY)
Facility: HOSPITAL | Age: 44
LOS: 1 days | Discharge: ANOTHER HOSPITAL | End: 2019-08-15
Attending: EMERGENCY MEDICINE | Admitting: EMERGENCY MEDICINE
Payer: MEDICARE

## 2019-08-15 VITALS
RESPIRATION RATE: 18 BRPM | OXYGEN SATURATION: 98 % | TEMPERATURE: 98 F | SYSTOLIC BLOOD PRESSURE: 129 MMHG | DIASTOLIC BLOOD PRESSURE: 86 MMHG | HEART RATE: 71 BPM

## 2019-08-15 DIAGNOSIS — Z98.84 BARIATRIC SURGERY STATUS: Chronic | ICD-10-CM

## 2019-08-15 PROCEDURE — 99285 EMERGENCY DEPT VISIT HI MDM: CPT | Mod: 25

## 2019-08-15 PROCEDURE — 93010 ELECTROCARDIOGRAM REPORT: CPT

## 2019-08-15 NOTE — ED ADULT TRIAGE NOTE - CHIEF COMPLAINT QUOTE
Pt complaining of right sided flank pain radiating to suprapubic area and urinary frequency. Pt states that she think she is passing a kidney stone. PT has history of kidney stones.

## 2019-08-16 ENCOUNTER — EMERGENCY (EMERGENCY)
Facility: HOSPITAL | Age: 44
LOS: 1 days | Discharge: HOME | End: 2019-08-16
Attending: EMERGENCY MEDICINE | Admitting: EMERGENCY MEDICINE
Payer: COMMERCIAL

## 2019-08-16 VITALS
TEMPERATURE: 98 F | DIASTOLIC BLOOD PRESSURE: 75 MMHG | OXYGEN SATURATION: 99 % | SYSTOLIC BLOOD PRESSURE: 123 MMHG | RESPIRATION RATE: 16 BRPM | HEART RATE: 72 BPM

## 2019-08-16 VITALS
OXYGEN SATURATION: 100 % | RESPIRATION RATE: 16 BRPM | TEMPERATURE: 98 F | SYSTOLIC BLOOD PRESSURE: 128 MMHG | HEART RATE: 65 BPM | DIASTOLIC BLOOD PRESSURE: 84 MMHG

## 2019-08-16 VITALS
HEART RATE: 72 BPM | SYSTOLIC BLOOD PRESSURE: 138 MMHG | RESPIRATION RATE: 18 BRPM | WEIGHT: 179.9 LBS | OXYGEN SATURATION: 98 % | TEMPERATURE: 98 F | DIASTOLIC BLOOD PRESSURE: 81 MMHG

## 2019-08-16 DIAGNOSIS — Z98.84 BARIATRIC SURGERY STATUS: Chronic | ICD-10-CM

## 2019-08-16 LAB
ANION GAP SERPL CALC-SCNC: 7 MMOL/L — LOW (ref 9–16)
APPEARANCE UR: CLEAR — SIGNIFICANT CHANGE UP
BILIRUB UR-MCNC: NEGATIVE — SIGNIFICANT CHANGE UP
BUN SERPL-MCNC: 15 MG/DL — SIGNIFICANT CHANGE UP (ref 7–23)
CALCIUM SERPL-MCNC: 9 MG/DL — SIGNIFICANT CHANGE UP (ref 8.5–10.5)
CHLORIDE SERPL-SCNC: 108 MMOL/L — SIGNIFICANT CHANGE UP (ref 96–108)
CO2 SERPL-SCNC: 28 MMOL/L — SIGNIFICANT CHANGE UP (ref 22–31)
COLOR SPEC: YELLOW — SIGNIFICANT CHANGE UP
CREAT SERPL-MCNC: 0.92 MG/DL — SIGNIFICANT CHANGE UP (ref 0.5–1.3)
DIFF PNL FLD: ABNORMAL
GLUCOSE SERPL-MCNC: 88 MG/DL — SIGNIFICANT CHANGE UP (ref 70–99)
GLUCOSE UR QL: NEGATIVE — SIGNIFICANT CHANGE UP
HCG SERPL-ACNC: <1 MIU/ML — SIGNIFICANT CHANGE UP
HCT VFR BLD CALC: 33.7 % — LOW (ref 34.5–45)
HGB BLD-MCNC: 11.6 G/DL — SIGNIFICANT CHANGE UP (ref 11.5–15.5)
KETONES UR-MCNC: ABNORMAL MG/DL
LEUKOCYTE ESTERASE UR-ACNC: ABNORMAL
LIDOCAIN IGE QN: 101 U/L — SIGNIFICANT CHANGE UP (ref 73–393)
MCHC RBC-ENTMCNC: 29.5 PG — SIGNIFICANT CHANGE UP (ref 27–34)
MCHC RBC-ENTMCNC: 34.4 G/DL — SIGNIFICANT CHANGE UP (ref 32–36)
MCV RBC AUTO: 85.8 FL — SIGNIFICANT CHANGE UP (ref 80–100)
NITRITE UR-MCNC: NEGATIVE — SIGNIFICANT CHANGE UP
PH UR: 6 — SIGNIFICANT CHANGE UP (ref 5–8)
PLATELET # BLD AUTO: 314 K/UL — SIGNIFICANT CHANGE UP (ref 150–400)
POTASSIUM SERPL-MCNC: 4.3 MMOL/L — SIGNIFICANT CHANGE UP (ref 3.5–5.3)
POTASSIUM SERPL-SCNC: 4.3 MMOL/L — SIGNIFICANT CHANGE UP (ref 3.5–5.3)
PROT UR-MCNC: ABNORMAL MG/DL
RBC # BLD: 3.93 M/UL — SIGNIFICANT CHANGE UP (ref 3.8–5.2)
RBC # FLD: 12.7 % — SIGNIFICANT CHANGE UP (ref 10.3–14.5)
SODIUM SERPL-SCNC: 143 MMOL/L — SIGNIFICANT CHANGE UP (ref 132–145)
SP GR SPEC: >=1.03 — SIGNIFICANT CHANGE UP (ref 1–1.03)
UROBILINOGEN FLD QL: 0.2 E.U./DL — SIGNIFICANT CHANGE UP
WBC # BLD: 11.4 K/UL — HIGH (ref 3.8–10.5)
WBC # FLD AUTO: 11.4 K/UL — HIGH (ref 3.8–10.5)

## 2019-08-16 PROCEDURE — 99284 EMERGENCY DEPT VISIT MOD MDM: CPT | Mod: 25

## 2019-08-16 PROCEDURE — 74177 CT ABD & PELVIS W/CONTRAST: CPT | Mod: 26

## 2019-08-16 PROCEDURE — 99284 EMERGENCY DEPT VISIT MOD MDM: CPT

## 2019-08-16 RX ORDER — MORPHINE SULFATE 50 MG/1
4 CAPSULE, EXTENDED RELEASE ORAL ONCE
Refills: 0 | Status: DISCONTINUED | OUTPATIENT
Start: 2019-08-16 | End: 2019-08-16

## 2019-08-16 RX ORDER — IBUPROFEN 200 MG
1 TABLET ORAL
Qty: 30 | Refills: 0
Start: 2019-08-16 | End: 2019-08-25

## 2019-08-16 RX ORDER — KETOROLAC TROMETHAMINE 30 MG/ML
30 SYRINGE (ML) INJECTION ONCE
Refills: 0 | Status: DISCONTINUED | OUTPATIENT
Start: 2019-08-16 | End: 2019-08-16

## 2019-08-16 RX ORDER — SODIUM CHLORIDE 9 MG/ML
1000 INJECTION INTRAMUSCULAR; INTRAVENOUS; SUBCUTANEOUS ONCE
Refills: 0 | Status: COMPLETED | OUTPATIENT
Start: 2019-08-16 | End: 2019-08-16

## 2019-08-16 RX ORDER — TAMSULOSIN HYDROCHLORIDE 0.4 MG/1
1 CAPSULE ORAL
Qty: 7 | Refills: 0
Start: 2019-08-16 | End: 2019-08-22

## 2019-08-16 RX ORDER — PANTOPRAZOLE SODIUM 20 MG/1
40 TABLET, DELAYED RELEASE ORAL ONCE
Refills: 0 | Status: COMPLETED | OUTPATIENT
Start: 2019-08-16 | End: 2019-08-16

## 2019-08-16 RX ORDER — HYDROMORPHONE HYDROCHLORIDE 2 MG/ML
0.5 INJECTION INTRAMUSCULAR; INTRAVENOUS; SUBCUTANEOUS ONCE
Refills: 0 | Status: DISCONTINUED | OUTPATIENT
Start: 2019-08-16 | End: 2019-08-16

## 2019-08-16 RX ORDER — ONDANSETRON 8 MG/1
1 TABLET, FILM COATED ORAL
Qty: 12 | Refills: 0
Start: 2019-08-16 | End: 2019-08-19

## 2019-08-16 RX ORDER — ONDANSETRON 8 MG/1
4 TABLET, FILM COATED ORAL ONCE
Refills: 0 | Status: COMPLETED | OUTPATIENT
Start: 2019-08-16 | End: 2019-08-16

## 2019-08-16 RX ADMIN — Medication 30 MILLIGRAM(S): at 01:07

## 2019-08-16 RX ADMIN — SODIUM CHLORIDE 1000 MILLILITER(S): 9 INJECTION INTRAMUSCULAR; INTRAVENOUS; SUBCUTANEOUS at 00:37

## 2019-08-16 RX ADMIN — MORPHINE SULFATE 4 MILLIGRAM(S): 50 CAPSULE, EXTENDED RELEASE ORAL at 00:37

## 2019-08-16 RX ADMIN — MORPHINE SULFATE 4 MILLIGRAM(S): 50 CAPSULE, EXTENDED RELEASE ORAL at 01:07

## 2019-08-16 RX ADMIN — MORPHINE SULFATE 4 MILLIGRAM(S): 50 CAPSULE, EXTENDED RELEASE ORAL at 03:24

## 2019-08-16 RX ADMIN — HYDROMORPHONE HYDROCHLORIDE 0.5 MILLIGRAM(S): 2 INJECTION INTRAMUSCULAR; INTRAVENOUS; SUBCUTANEOUS at 03:23

## 2019-08-16 RX ADMIN — PANTOPRAZOLE SODIUM 40 MILLIGRAM(S): 20 TABLET, DELAYED RELEASE ORAL at 03:23

## 2019-08-16 RX ADMIN — SODIUM CHLORIDE 1000 MILLILITER(S): 9 INJECTION INTRAMUSCULAR; INTRAVENOUS; SUBCUTANEOUS at 03:23

## 2019-08-16 RX ADMIN — MORPHINE SULFATE 4 MILLIGRAM(S): 50 CAPSULE, EXTENDED RELEASE ORAL at 02:25

## 2019-08-16 RX ADMIN — ONDANSETRON 4 MILLIGRAM(S): 8 TABLET, FILM COATED ORAL at 02:25

## 2019-08-16 RX ADMIN — Medication 30 MILLIGRAM(S): at 00:37

## 2019-08-16 RX ADMIN — SODIUM CHLORIDE 1000 MILLILITER(S): 9 INJECTION INTRAMUSCULAR; INTRAVENOUS; SUBCUTANEOUS at 09:27

## 2019-08-16 NOTE — ED ADULT TRIAGE NOTE - NS ED TRIAGE AVPU SCALE
Alert-The patient is alert, awake and responds to voice. The patient is oriented to time, place, and person. The triage nurse is able to obtain subjective information. Daysi Booker), Medicine  Dermatology  [address] [place] [address]  Phone: [phone]  Fax: [phone]

## 2019-08-16 NOTE — ED PROVIDER NOTE - CLINICAL SUMMARY MEDICAL DECISION MAKING FREE TEXT BOX
This is a pleasant 43 year old female pmhx asthma, copd and renal stone presenting to [facility] from [facility] 2/2 a 8mm stone (right sided). at the time of initial evaluation at [facility] patient endorsed nausea vomiting flank pain as well as dizziness, to which the patient states has subsided since medication administration. physical exam, patient appears well, non-toxic. patient is sleeping. abdomen is soft, no cvat. urology consulted who came to see and evaluate the patient. This is a pleasant 43 year old female pmhx asthma, copd and renal stone presenting to [facility] from [facility] 2/2 a 8mm stone (right sided). at the time of initial evaluation at [facility] patient endorsed nausea vomiting flank pain as well as dizziness, to which the patient states has subsided since medication administration. physical exam, patient appears well, non-toxic. patient is sleeping. abdomen is soft, no cvat. urology consulted who came to see and evaluate the patient. given that patient is able to tolerate po and not requiring pain medication ad this time, urology recommends d/c home. patient states that she would like dc home as well. patient is urged to present back to the ed for any worsening of symptoms. encouraged to call urology office today for follow up on Monday. At this time, the evidence for any other entities in the differential is insufficient to justify any further testing. This was discussed and explained to the patient. It was advised that persistent or worsening symptoms would require further evaluation. This was discussed with the patient and family using shared decision making. ED evaluation and management discussed with the patient and family (if available) in detail.  Close PMD follow up encouraged.  Strict ED return instructions discussed in detail and patient given the opportunity to ask any questions about their discharge diagnosis and instructions. Patient verbalized understanding. Patient is agreeable to plan.

## 2019-08-16 NOTE — ED ADULT NURSE NOTE - CHIEF COMPLAINT QUOTE
pt. transferred from [facility] for further evaluation and tx of Rt side kidney stone. pt. states she is nauseous and lightheaded, denies any pain, as per report the stone is 8.1x3.6mm.

## 2019-08-16 NOTE — ED PROVIDER NOTE - NSFOLLOWUPCLINICS_GEN_ALL_ED_FT
[facility]  Urology  [address], [address]rd [address]  [place], [place] [address]  Phone: [phone]  Fax:   Follow Up Time:

## 2019-08-16 NOTE — CONSULT NOTE ADULT - SUBJECTIVE AND OBJECTIVE BOX
CONSULT NOTE:     Patient is a 43y old  Female who presents with a chief complaint of right groin/flank pain    HPI: 44 y/o female with hx of kidney stones presents to the ED c/o right flank/groin pain x12 hours. Patient reports pain onset as gradual and 9/10 at worst. She has a hx of stones and states the pain is similar to her previous stones. she was diagnosed with right sided 7mm kidney stone in 3 months ago at [facility] and states she never followed up. Denies fever/chills, n/v, abd pain, dysuria, hematuria.       Vital Signs Last 24 Hrs  T(C): 36.4 (16 Aug 2019 10:32), Max: 36.6 (16 Aug 2019 05:44)  T(F): 97.5 (16 Aug 2019 10:32), Max: 97.9 (16 Aug 2019 05:44)  HR: 65 (16 Aug 2019 10:32) (65 - 88)  BP: 128/84 (16 Aug 2019 10:32) (113/73 - 138/81)  BP(mean): --  RR: 16 (16 Aug 2019 10:32) (16 - 18)  SpO2: 100% (16 Aug 2019 10:32) (98% - 100%)  I&O's Summary      PE:  Gen: NAD  Abd: soft, ntnd  : +BRP, neg CVAT b/l    LABS:                        11.6   11.4  )-----------( 314      ( 16 Aug 2019 00:33 )             33.7     08-16    143  |  108  |  15  ----------------------------<  88  4.3   |  28  |  0.92    Ca    9.0      16 Aug 2019 00:33        Cultures      A/P: Patient is a  44 y/o female with hx of kidney stones presents to the ED c/o right flank/groin pain x12 hours. Afebrile, wbc 11. Creat: 0.9. CT showing 8mm right ureteral stone near UVJ. Pain controlled with morphine, toradol in ED and hydration.   -f/u with Dr. Schmidt or with  Clinic  -hydrate well  -strain urine  -flomax, percocet  -d/w with  team

## 2019-08-16 NOTE — ED ADULT NURSE REASSESSMENT NOTE - NS ED NURSE REASSESS COMMENT FT1
Asked pt to give me urine for cultures, pt aware she is going to [facility] main ER, vital sings as charted, nil c/o pain

## 2019-08-16 NOTE — ED PROVIDER NOTE - PHYSICAL EXAMINATION
General: Patient is well developed and well nourised. Patient is alert and oriented to person, place and date. Patient is laying comfortably in stretcher and appears in no acute distress.  HEENT: Head is normocephalic and atraumatic. Pupils are equal, round and reactive. Extraocular movements intact. No evidence of nystagmus, conjunctival injection, or scleral icterus. External ears symmetric without evidence of discharge.  Nose is symmetric, non-tender, patent without evidence of discharge. Teeth in good repair. Uvula midline.   Heart: Regular rate and rhythm. No murmurs, rubs or gallops.   Lungs: Clear to auscultation bilaterally with equal chest expansion. No note of wheezes, rhonchi, rales. Equal chest expansion. No note of retractions.  Abdomen: Bowel sounds present in all four quadrants. Soft, non-tender, non-distended without signs of masses, rebound or guarding. No note of hepatosplenomegaly. No CVA tenderness bilaterally. Negative Franco sign. No pain present over McBurney's point.  Neuro: GCS 15. Moving all extremities without discomfort. gait steady   Skin: Warm, dry and intact without evidence of rashes, bruising, pallor, jaundice or cyanosis.   Psych: Mood and affect appropriate.

## 2019-08-16 NOTE — ED ADULT NURSE REASSESSMENT NOTE - NS ED NURSE REASSESS COMMENT FT1
Checked in on patient, currently pain free, asking for something to drink given to her as per dr buckner

## 2019-08-16 NOTE — ED ADULT NURSE REASSESSMENT NOTE - NS ED NURSE REASSESS COMMENT FT1
pt given 0.5mg iv dilaudid, wasted rest of meds with Rn Alphonso, given iv pantoprazole also, family at bedside

## 2019-08-16 NOTE — ED PROVIDER NOTE - OBJECTIVE STATEMENT
states that the pain began yesterday with associated nausea vomiting and flank pain. states that she was seen at [facility] and transferred to this facility for further evaluation by urology for an 8mm stone. patient states that she is feeling well without pain after medication. does not endorse fevers chills currently nausea vomiting diarrhea chest pain palpitations cough shortness of breath abdominal of flank pain. states that she had a renal stone in May "but I never followed up for it". does not endorse dysuria or hematuria.

## 2019-08-16 NOTE — ED PROVIDER NOTE - ATTENDING CONTRIBUTION TO CARE
hx of kidney stones, seen for similar in Jan and passed on own despite offered surgery at that time. seen at [facility] for same and transferred for  eval. seen multiple times in the ED by  team and offered admission. pt prefers to go home and try to pass stone on her own, is aware that this stone is much bigger than in January. has not required pain meds since 3am. admission offered by ED and  teams. very strict return precautions given, ron as weekend. plan for outpatient follow up and return immediately to the ed for admission for any worsening in pain, n/v, fevers, or any concerning symptoms. VSS, pt well appearing, abdomen soft, non tender, no cva tenderness

## 2019-08-16 NOTE — ED ADULT NURSE REASSESSMENT NOTE - NS ED NURSE REASSESS COMMENT FT1
Patient report received from previous RN, patient at this time is awake, alert and oriented x3, awaiting for further order and disposition from MD, will continue to watch and assess patient, safety and comfort measures maintained, family at bedside with patient.

## 2019-08-16 NOTE — ED PROVIDER NOTE - CLINICAL SUMMARY MEDICAL DECISION MAKING FREE TEXT BOX
flank pain R, hx of kidney stone hx of gastric bypass, stone on CT, transfer ED to ED for eval by urology

## 2019-08-16 NOTE — ED ADULT NURSE NOTE - FINAL NURSING ELECTRONIC SIGNATURE
After Visit Summary   10/5/2018    Jose E Capps    MRN: [id number]           Patient Information     Date Of Birth          9/28/1930        Visit Information        Provider Department      10/5/2018 5:00 AM Juan Hairston MD [facility]        Today's Diagnoses     Physical deconditioning    -  1    Cerebrovascular accident involving posterior circulation (H)        Vertebral artery dissection (H)        Paroxysmal atrial fibrillation (H)        Atherosclerosis of native coronary artery of native heart without angina pectoris        Essential hypertension        Hyperlipidemia LDL goal <100        Cognitive impairment           Follow-ups after your visit        Who to contact     If you have questions or need follow up information about today's clinic visit or your schedule please contact [facility] directly at [phone].  Normal or non-critical lab and imaging results will be communicated to you by StackSearchhart, letter or phone within 4 business days after the clinic has received the results. If you do not hear from us within 7 days, please contact the clinic through StackSearchhart or phone. If you have a critical or abnormal lab result, we will notify you by phone as soon as possible.  Submit refill requests through Hydrophi or call your pharmacy and they will forward the refill request to us. Please allow 3 business days for your refill to be completed.          Additional Information About Your Visit        MyChart Information     Hydrophi gives you secure access to your electronic health record. If you see a primary care provider, you can also send messages to your care team and make appointments. If you have questions, please call your primary care clinic.  If you do not have a primary care provider, please call [phone] and they will assist you.        Care EveryWhere ID     This is your Care EveryWhere ID. This could be used by other organizations to  "access your [facility] medical records  [id number]        Your Vitals Were     Pulse Temperature Respirations Height Pulse Oximetry BMI (Body Mass Index)    89 99.4  F (37.4  C) 16 5' 10\" (1.778 m) 92% 24.31 kg/m2       Blood Pressure from Last 3 Encounters:   10/05/18 117/72   10/02/18 176/77   10/01/18 133/67    Weight from Last 3 Encounters:   10/05/18 169 lb 6.4 oz (76.8 kg)   10/02/18 170 lb (77.1 kg)   09/30/18 165 lb 12.6 oz (75.2 kg)              Today, you had the following     No orders found for display       Primary Care Provider Office Phone # Fax #    Nehemias Granados [phone]       [address] [facility] [place] [address]        Equal Access to Services     [facility]: Hadii aad ku hadasho Soomaali, waaxda luqadaha, qaybta kaalmada adeegyada, waxay maryjoin haysantosh martinezn . So [facility] [phone].    ATENCIÓN: Si habla español, tiene a bradley disposición servicios gratuitos de asistencia lingüística. Joselin al [phone].    We comply with applicable federal civil rights laws and [place] laws. We do not discriminate on the basis of race, color, national origin, age, disability, sex, sexual orientation, or gender identity.            Thank you!     Thank you for choosing [facility]  for your care. Our goal is always to provide you with excellent care. Hearing back from our patients is one way we can continue to improve our services. Please take a few minutes to complete the written survey that you may receive in the mail after your visit with us. Thank you!             Your Updated Medication List - Protect others around you: Learn how to safely use, store and throw away your medicines at www.disposemymeds.org.          This list is accurate as of 10/5/18  3:56 PM.  Always use your most recent med list.                   Brand Name Dispense Instructions for use Diagnosis    acetaminophen 500 MG tablet    MAPAP    93 tablet    Take 1 tablet (500 mg) by " mouth every 8 hours as needed for fever or pain    Seronegative rheumatoid arthritis (H)       apixaban ANTICOAGULANT 5 MG tablet    ELIQUIS     Take 1 tablet (5 mg) by mouth 2 times daily    Atrial fibrillation, unspecified type (H)       atorvastatin 10 MG tablet    LIPITOR    90 tablet    Take 1 tablet (10 mg) by mouth daily    Mixed hyperlipidemia       Calcium + D3 600-200 MG-UNIT Tabs     90 tablet    TAKE 1 TABLET BY MOUTH ONCE DAILY    Imbalanced nutrition       CEROVITE SENIOR Tabs     93 tablet    TAKE 1 TABLET BY MOUTH ONCE DAILY    Hypertension goal BP (blood pressure) < 140/90       fish oil-omega-3 fatty acids 1000 MG capsule     31 capsule    TAKE 1 CAPSULE BY MOUTH ONCE DAILY    Atherosclerosis of native coronary artery of native heart without angina pectoris       leflunomide 10 MG tablet    ARAVA     Take 1 tablet (10 mg) by mouth daily Hold it until you will return home    Seronegative rheumatoid arthritis (H)       lisinopril 20 MG tablet    PRINIVIL/ZESTRIL    90 tablet    Take 1 tablet (20 mg) by mouth daily    Hypertension goal BP (blood pressure) < 140/90       metoprolol tartrate 25 MG tablet    LOPRESSOR    60 tablet    Take 0.25 tablets (6.25 mg) by mouth 2 times daily    Atrial fibrillation, unspecified type (H)       polyethylene glycol Packet    MIRALAX/GLYCOLAX     Take 17 g by mouth daily as needed for constipation        sennosides 8.6 MG tablet    SENOKOT     Take 4 tablets by mouth 2 times daily as needed           16-Aug-2019 10:49

## 2019-08-16 NOTE — ED ADULT TRIAGE NOTE - CHIEF COMPLAINT QUOTE
pt. transferred from [facility] for further evaluation and tx of Rt side kidney stone. pt. states she is nauseous and lightheaded, denies any pain, as per report the stone is 8.1x3.6mm.

## 2019-08-16 NOTE — ED ADULT NURSE NOTE - OBJECTIVE STATEMENT
pt. was transferred from [facility] for further evaluation and tx of Rt kidney stone and Rt renal hydroureteronephrosis. pt. was medicated for pain with morphine, toradol and dilaudid, denies any pain at present time, c/o feeling lightheaded and nauseous. pt. is A&Ox3, breathing unlabored, regular, VS stable. awaiting evaluation by  team.

## 2019-08-16 NOTE — ED PROVIDER NOTE - NSFOLLOWUPINSTRUCTIONS_ED_ALL_ED_FT
please drink plenty of fluids and take medication as prescribed    please come back to er for any worsening of symptoms    please call urology clinic today for follow up appointment    RENAL COLIC - General Information     Renal Colic    WHAT YOU NEED TO KNOW:    What is renal colic? Renal colic is severe pain in your lower back or sides. The pain is usually on one side, but may be on both sides of your lower back. Renal colic may start quickly, come and go, and become worse over time.    What causes renal colic? Renal colic is caused by a blockage in your urinary tract. The urinary tract includes your kidneys, ureters, bladder, and urethra. Ureters carry urine from your kidneys to your bladder. The urethra carries urine to the outside when you urinate. The most common cause of a blockage in the urinary tract is a kidney stone. Blood clots, ureter spasms, and dead tissue may also block your urinary tract.    What other signs and symptoms may occur with renal colic?     Severe low back, abdominal, or groin pain      Pain when you urinate      Nausea and vomiting      Feeling the need to urinate often, or right away      Urinating less than what is normal for you, or not at all      Fever    How is renal colic diagnosed?     Blood and urine tests may show infection or kidney function.      An x-ray, ultrasound, CT, or MRI may show a kidney stone or other causes of your pain. You may be given contrast liquid to help your urinary tract show up better in the pictures. Tell the healthcare provider if you have ever had an allergic reaction to contrast liquid. Do not enter the MRI room with anything metal. Metal can cause serious injury. Tell the healthcare provider if you have any metal in or on your body.    How is renal colic treated?     Medicines may help decrease pain and muscle spasms. You may also need medicine to calm your stomach and stop vomiting.      Surgery may be needed to remove a blockage. Surgery may also be needed if your kidneys are not working properly.    How can I manage my symptoms?     Drink liquids as directed to help decrease pain and flush blockages from your urinary tract. Ask how much liquid to drink each day and which liquids are best for you. You may need to drink about 3 liters (12 glasses) of liquids each day. Half of your total daily liquids should be water. Limit coffee, tea, and soda to 2 cups daily. Your urine should be pale and clear.      Strain your urine every time you urinate. Urinate into a strainer (funnel with a fine mesh on the bottom) or glass jar to collect kidney stones. Give the kidney stones to your healthcare provider at your next visit.Look for Stones in the Filter           Eat a variety of healthy foods. Healthy foods include fruits, vegetables, whole-grain breads, low-fat dairy products, beans, lean meats, and fish. You may need to increase the amount of citrus fruit you eat, such as oranges. Ask your healthcare provider how much salt, calcium, and protein you should eat.      Avoid activity in hot temperatures. Heat may cause you to become dehydrated and urinate less.    When should I seek immediate care?     You cannot stop vomiting.      You see new or increased bleeding when you urinate.      You are urinating less than usual, or not at all.      Your pain is not getting better even after treatment.    When should I contact my healthcare provider?     You have fever.      You need to urinate more often than usual, or right away.      You see a stone in your urine strainer after you urinate.      You have questions or concerns about your condition or care.    CARE AGREEMENT:    You have the right to help plan your care. Learn about your health condition and how it may be treated. Discuss treatment options with your healthcare providers to decide what care you want to receive. You always have the right to refuse treatment.        © Copyright [employer] 2019 All illustrations and images included in CareNotes are the copyrighted property of A.D.A.M., Inc. or [employer].      back to top                      © Copyright [employer] 2019

## 2019-08-16 NOTE — ED PROVIDER NOTE - CARE PROVIDER_API CALL
Gerardo Schmidt)  Urology  [address] [place] [address]  Phone: [phone]  Fax: [phone]  Follow Up Time:

## 2019-08-17 LAB
CULTURE RESULTS: SIGNIFICANT CHANGE UP
SPECIMEN SOURCE: SIGNIFICANT CHANGE UP

## 2019-08-20 DIAGNOSIS — N20.0 CALCULUS OF KIDNEY: ICD-10-CM

## 2019-08-20 DIAGNOSIS — R10.9 UNSPECIFIED ABDOMINAL PAIN: ICD-10-CM

## 2019-08-23 DIAGNOSIS — J44.9 CHRONIC OBSTRUCTIVE PULMONARY DISEASE, UNSPECIFIED: ICD-10-CM

## 2019-08-23 DIAGNOSIS — Z79.899 OTHER LONG TERM (CURRENT) DRUG THERAPY: ICD-10-CM

## 2019-08-23 DIAGNOSIS — R10.9 UNSPECIFIED ABDOMINAL PAIN: ICD-10-CM

## 2019-08-23 DIAGNOSIS — N20.0 CALCULUS OF KIDNEY: ICD-10-CM

## 2019-11-13 PROCEDURE — 84702 CHORIONIC GONADOTROPIN TEST: CPT

## 2019-11-13 PROCEDURE — 86900 BLOOD TYPING SEROLOGIC ABO: CPT

## 2019-11-13 PROCEDURE — 85610 PROTHROMBIN TIME: CPT

## 2019-11-13 PROCEDURE — 94640 AIRWAY INHALATION TREATMENT: CPT

## 2019-11-13 PROCEDURE — 99285 EMERGENCY DEPT VISIT HI MDM: CPT | Mod: 25

## 2019-11-13 PROCEDURE — 84100 ASSAY OF PHOSPHORUS: CPT

## 2019-11-13 PROCEDURE — 96375 TX/PRO/DX INJ NEW DRUG ADDON: CPT

## 2019-11-13 PROCEDURE — 80053 COMPREHEN METABOLIC PANEL: CPT

## 2019-11-13 PROCEDURE — 86901 BLOOD TYPING SEROLOGIC RH(D): CPT

## 2019-11-13 PROCEDURE — 80048 BASIC METABOLIC PNL TOTAL CA: CPT

## 2019-11-13 PROCEDURE — 85730 THROMBOPLASTIN TIME PARTIAL: CPT

## 2019-11-13 PROCEDURE — 85025 COMPLETE CBC W/AUTO DIFF WBC: CPT

## 2019-11-13 PROCEDURE — 76770 US EXAM ABDO BACK WALL COMP: CPT

## 2019-11-13 PROCEDURE — 71045 X-RAY EXAM CHEST 1 VIEW: CPT

## 2019-11-13 PROCEDURE — 36415 COLL VENOUS BLD VENIPUNCTURE: CPT

## 2019-11-13 PROCEDURE — 81001 URINALYSIS AUTO W/SCOPE: CPT

## 2019-11-13 PROCEDURE — 83735 ASSAY OF MAGNESIUM: CPT

## 2019-11-13 PROCEDURE — 96374 THER/PROPH/DIAG INJ IV PUSH: CPT

## 2019-11-13 PROCEDURE — 86850 RBC ANTIBODY SCREEN: CPT

## 2019-11-13 PROCEDURE — 74018 RADEX ABDOMEN 1 VIEW: CPT

## 2020-11-23 NOTE — ED ADULT NURSE NOTE - NSSUHOSCREENINGYN_ED_ALL_ED
1.  Continue IV antibiotics X 2 more weeks.  I will communicate with [facility]  2.  Continue weekly labs - will schedule to coordinate with Podiatry appointments.   3.  ID follow up in 2 weeks - will coordinate with day of Podiatry appointment.     4.  Call PCP to see ASAP regarding elevated blood pressures.   5.  To ED with any persistent headaches, chest pain, SOB, visual disturbances   Yes - the patient is able to be screened

## 2021-05-05 NOTE — PATIENT PROFILE ADULT - DO YOU FEEL UNSAFE AT SCHOOL?
not applicable Rifampin Pregnancy And Lactation Text: This medication is Pregnancy Category C and it isn't know if it is safe during pregnancy. It is also excreted in breast milk and should not be used if you are breast feeding.

## 2021-07-18 ENCOUNTER — APPOINTMENT (OUTPATIENT)
Dept: DISASTER EMERGENCY | Facility: CLINIC | Age: 46
End: 2021-07-18

## 2021-07-18 DIAGNOSIS — Z01.818 ENCOUNTER FOR OTHER PREPROCEDURAL EXAMINATION: ICD-10-CM

## 2021-07-19 LAB — SARS-COV-2 N GENE NPH QL NAA+PROBE: NOT DETECTED

## 2021-07-21 ENCOUNTER — RESULT REVIEW (OUTPATIENT)
Age: 46
End: 2021-07-21

## 2021-09-09 ENCOUNTER — NON-APPOINTMENT (OUTPATIENT)
Age: 46
End: 2021-09-09

## 2021-09-09 ENCOUNTER — APPOINTMENT (OUTPATIENT)
Dept: OBGYN | Facility: CLINIC | Age: 46
End: 2021-09-09
Payer: MEDICARE

## 2021-09-09 VITALS
SYSTOLIC BLOOD PRESSURE: 116 MMHG | TEMPERATURE: 97.7 F | WEIGHT: 185 LBS | BODY MASS INDEX: 30.82 KG/M2 | HEIGHT: 65 IN | RESPIRATION RATE: 16 BRPM | DIASTOLIC BLOOD PRESSURE: 74 MMHG

## 2021-09-09 DIAGNOSIS — Z80.41 FAMILY HISTORY OF MALIGNANT NEOPLASM OF OVARY: ICD-10-CM

## 2021-09-09 DIAGNOSIS — Z78.9 OTHER SPECIFIED HEALTH STATUS: ICD-10-CM

## 2021-09-09 DIAGNOSIS — Z83.3 FAMILY HISTORY OF DIABETES MELLITUS: ICD-10-CM

## 2021-09-09 DIAGNOSIS — J44.9 CHRONIC OBSTRUCTIVE PULMONARY DISEASE, UNSPECIFIED: ICD-10-CM

## 2021-09-09 DIAGNOSIS — Z82.49 FAMILY HISTORY OF ISCHEMIC HEART DISEASE AND OTHER DISEASES OF THE CIRCULATORY SYSTEM: ICD-10-CM

## 2021-09-09 DIAGNOSIS — Z87.442 PERSONAL HISTORY OF URINARY CALCULI: ICD-10-CM

## 2021-09-09 DIAGNOSIS — Z80.1 FAMILY HISTORY OF MALIGNANT NEOPLASM OF TRACHEA, BRONCHUS AND LUNG: ICD-10-CM

## 2021-09-09 DIAGNOSIS — Z86.59 PERSONAL HISTORY OF OTHER MENTAL AND BEHAVIORAL DISORDERS: ICD-10-CM

## 2021-09-09 DIAGNOSIS — Z86.2 PERSONAL HISTORY OF DISEASES OF THE BLOOD AND BLOOD-FORMING ORGANS AND CERTAIN DISORDERS INVOLVING THE IMMUNE MECHANISM: ICD-10-CM

## 2021-09-09 PROCEDURE — 99204 OFFICE O/P NEW MOD 45 MIN: CPT

## 2021-09-10 NOTE — LETTER GREETING
[Dear  ___] : Dear  [unfilled], [FreeTextEntry1] : I had the pleasure of evaluating your patient, ANNETTE ABDI. Please see my summary of recommendations followed by my full consultation note. \par \par Thank you for allowing me to participate in the care of this patient. If you have any questions, please do not hesitate to contact me.\par \par Sincerely,\par \par Debra Manzano MD, FACOG \par [facility]\par Obstetrics and Gynecology in [place]\[address], [facility] [address]\[place] [address]\[facility] Phone: [phone] Fax: [phone]

## 2021-09-10 NOTE — PHYSICAL EXAM
[Appropriately responsive] : appropriately responsive [Alert] : alert [No Acute Distress] : no acute distress [Soft] : soft [Non-tender] : non-tender [Non-distended] : non-distended [No HSM] : No HSM [No Lesions] : no lesions [No Mass] : no mass [Oriented x3] : oriented x3 [Labia Majora] : normal [Labia Minora] : normal [Normal] : normal [Anteversion] : anteverted [Uterine Adnexae] : normal [Tenderness] : nontender [FreeTextEntry5] : IUD strings not visualized [FreeTextEntry6] : 12 week globular mobile uterus, no adnexal masses/tenderness

## 2021-09-10 NOTE — REASON FOR VISIT
[Consultation] : consultation for [FreeTextEntry2] : surgical consultation [FreeTextEntry1] : Dr. Anjana May

## 2021-09-10 NOTE — HISTORY OF PRESENT ILLNESS
[Patient reported mammogram was normal] : Patient reported mammogram was normal [Patient reported PAP Smear was normal] : Patient reported PAP Smear was normal [Monogamous (Male Partner)] : is monogamous with a male partner [Y] : Positive pregnancy history [Mammogramdate] : 2021 [TextBox_19] : alejandro CARRILLO, stable [PapSmeardate] : 2021 [LMPDate] : 8/23 still bleeding [de-identified] : 30 yrs [PGHxTotal] : 3 [[facility]] : 3 [[facility] Centeriving] : 3 [PGHxABInduced] : 1 [FreeTextEntry1] : NSVDx3. +h/o cysts and fibroids, denies abnormal pap, denies STI

## 2021-09-10 NOTE — DISCUSSION/SUMMARY
[FreeTextEntry1] : 44 yo with AUB, fibroids, s/p Mirena IUD insertion unable to visualize strings. \par -Records requested to review recent imaging and biopsy results\par -Given she is currently bleeding and with anemia, discussed starting Aygestin 5mg daily, increase to BID if needed, while awaiting records\par -Continue hematology follow-up and iron infusions\par -Discussed medical options including pills, depo, IUD which she is currently trying and not working, Lupron as a temporizing measure. Discussed surgical options of endometrial ablation if cavity is not too large, uterine artery embolization, hysterectomy. Based on her exam, she is a candidate for minimally invasive approach, I recommend robotic. Discussed risks vs benefits of removal vs retention of cervix, prophylactic salpingectomy, conservation vs removal of ovaries. She wants to think about her options. \par -Precautions given to go to ED if SOB, dizziness, palpitations as these are symptoms of severe anemia\par -I will contact her after I receive the rest of her records to review her plan

## 2021-09-16 ENCOUNTER — NON-APPOINTMENT (OUTPATIENT)
Age: 46
End: 2021-09-16

## 2021-09-16 DIAGNOSIS — T83.32XA DISPLACEMENT OF INTRAUTERINE CONTRACEPTIVE DEVICE, INITIAL ENCOUNTER: ICD-10-CM

## 2021-09-27 ENCOUNTER — NON-APPOINTMENT (OUTPATIENT)
Age: 46
End: 2021-09-27

## 2021-10-05 NOTE — ED ADULT TRIAGE NOTE - WEIGHT IN KG
81.6 Post-Care Instructions: I reviewed with the patient in detail post-care instructions. Patient is not to engage in any heavy lifting, exercise, or swimming for the next 14 days. Should the patient develop any fevers, chills, bleeding, severe pain patient will contact the office immediately.

## 2021-10-27 ENCOUNTER — APPOINTMENT (OUTPATIENT)
Dept: OBGYN | Facility: CLINIC | Age: 46
End: 2021-10-27
Payer: MEDICARE

## 2021-10-27 VITALS
WEIGHT: 185 LBS | SYSTOLIC BLOOD PRESSURE: 110 MMHG | DIASTOLIC BLOOD PRESSURE: 64 MMHG | BODY MASS INDEX: 30.82 KG/M2 | TEMPERATURE: 97.9 F | HEIGHT: 65 IN | RESPIRATION RATE: 16 BRPM

## 2021-10-27 DIAGNOSIS — N93.9 ABNORMAL UTERINE AND VAGINAL BLEEDING, UNSPECIFIED: ICD-10-CM

## 2021-10-27 DIAGNOSIS — D21.9 BENIGN NEOPLASM OF CONNECTIVE AND OTHER SOFT TISSUE, UNSPECIFIED: ICD-10-CM

## 2021-10-27 PROCEDURE — 99214 OFFICE O/P EST MOD 30 MIN: CPT

## 2021-10-28 ENCOUNTER — NON-APPOINTMENT (OUTPATIENT)
Age: 46
End: 2021-10-28

## 2021-10-28 PROBLEM — N93.9 ABNORMAL UTERINE BLEEDING: Status: ACTIVE | Noted: 2021-09-10

## 2021-10-28 PROBLEM — D21.9 FIBROIDS: Status: ACTIVE | Noted: 2021-09-10

## 2021-10-29 NOTE — HISTORY OF PRESENT ILLNESS
[FreeTextEntry1] : Pt here for follow-up. States she had CBC at hematologist recently and was 8. Had PST at [place] yesterday, results pending. She is scheduled for surgery next week. We discussed need for Hb >10 before proceeding with elective surgery. She continues to have AUB. \par \par Discussed surgical plan for robot-assisted laparoscopic supracervical hysterectomy, bilateral salpingectomy, possible bilateral ovarian cystectomy,possible right or left oophorectomy. Discussed risks to include, but are not limited to, bleeding, possible transfusion, infection, fistula, damage to nearby organs, vessels, or nerves resulting in temporary or permanent injury, deep venous thrombosis, and perioperative death. Discussed risk of laparotomy if unable to have adequate visualization to complete using minimally invasive approach. We reviewed the planned location of the incisions and I showed them to her on her abdomen. \par \par She also understands the limitations of laparoscopic surgery and the possibility of missing a surgical complication with need for subsequent re-exploration. She also understands the rationale for a cystoscopy at the completion of the procedure and the potential risks of cystoscopy. \par \par Discussed the need for contained extraction of the uterus containing fibroids in a bag when using a minimally invasive approach. We discussed that this is done with the specimens in a bag using a technique called hand morcellation with a scalpel. We discussed that morcellation is done through one of the abdominal incisions. We discussed the risk of unexpected leiomyosarcoma may range from 1 in 770 surgeries to less than 1 in 10,000 surgeries for presumed symptomatic leiomyomas.  We discussed that there are not good markers to diagnose leiomyosarcoma preoperatively. We discussed the risk of hand morcellation of a uterus containing leiomyosarcoma which could spread cancerous cells if there are possible leaks in the bag during morcellation at the time of the surgery, which could spread disease and worsen prognosis. We discussed that we take all precautions to minimize the risk of leaks in the bag to minimize this subsequent risk. We discussed the risks and benefits of minimally invasive surgery vs open abdominal hysterectomy. She understands these risks and benefits and wishes to proceed with minimally invasive surgery. \par \par Postoperative pain management was reviewed with plan for alternating Tylenol and Oxycodone for postoperative pain. She cannot take Motrin due to hx of gastric bypass. She currently takes Oxycodone 10mg tablets for chronic back pain. Discussed Oxycodone 5mg q4-6 hrs prn pain after surgery if necessary. Discussed not taking Oxycodone 10mg tablets when she is taking 5mg tablets for postoperative pain. Discussed the benefit of ice packs x first 24 hours.  Discussed postoperative expectations and restrictions. \par \par She agrees to proceed. All of her questions were answered to her satisfaction. \par \par er questions were answered to her satisfaction.

## 2021-10-29 NOTE — DISCUSSION/SUMMARY
[FreeTextEntry1] : 45 yo with AUB desiring definitive surgical management. \par -Follow up PST results. Pt to continue IV Iron infusions. Discussed need to send CBC results. If Hb <10, surgery will need to be rescheduled. \par -Continue Aygestin until surgery\par -Plan for robot-assisted laparoscopic supracervical hysterectomy, bilateral salpingectomy, possible bilateral ovarian cystectomy\par -Rx Oxycodone 5mg tablets sent to pharmacy, instructed on use\par -Postoperative restrictions and expectations discussed

## 2021-10-31 ENCOUNTER — NON-APPOINTMENT (OUTPATIENT)
Age: 46
End: 2021-10-31

## 2021-11-10 ENCOUNTER — RX RENEWAL (OUTPATIENT)
Age: 46
End: 2021-11-10

## 2021-11-10 RX ORDER — NORETHINDRONE ACETATE 5 MG/1
5 TABLET ORAL
Qty: 60 | Refills: 1 | Status: ACTIVE | COMMUNITY
Start: 2021-09-10 | End: 1900-01-01

## 2021-11-30 ENCOUNTER — RESULT REVIEW (OUTPATIENT)
Age: 46
End: 2021-11-30

## 2021-11-30 ENCOUNTER — APPOINTMENT (OUTPATIENT)
Dept: OBGYN | Facility: HOSPITAL | Age: 46
End: 2021-11-30

## 2021-12-01 ENCOUNTER — NON-APPOINTMENT (OUTPATIENT)
Age: 46
End: 2021-12-01

## 2021-12-01 RX ORDER — OXYCODONE 5 MG/1
5 TABLET ORAL
Qty: 10 | Refills: 0 | Status: ACTIVE | COMMUNITY
Start: 2021-10-27 | End: 1900-01-01

## 2021-12-13 ENCOUNTER — APPOINTMENT (OUTPATIENT)
Dept: OBGYN | Facility: CLINIC | Age: 46
End: 2021-12-13
Payer: MEDICARE

## 2021-12-13 VITALS
DIASTOLIC BLOOD PRESSURE: 84 MMHG | WEIGHT: 180 LBS | BODY MASS INDEX: 29.99 KG/M2 | HEIGHT: 65 IN | SYSTOLIC BLOOD PRESSURE: 120 MMHG

## 2021-12-13 PROCEDURE — 99024 POSTOP FOLLOW-UP VISIT: CPT

## 2021-12-13 NOTE — DISCUSSION/SUMMARY
[FreeTextEntry1] : 45 yo s/p robot-assisted laparoscopic hysterectomy, bilateral salpingectomy, left ovarian cystectomy, cystoscopy doing well postoperatively. \par -Postoperative restrictions and expectations discussed\par -Return in 4 weeks for follow-up Ear Wedge Repair Text: A wedge excision was completed by carrying down an excision through the full thickness of the ear and cartilage with an inward facing Burow's triangle. The wound was then closed in a layered fashion.

## 2021-12-13 NOTE — HISTORY OF PRESENT ILLNESS
[FreeTextEntry1] : 45 yo s/p robot-assisted laparoscopic hysterectomy, bilateral salpingectomy, left ovarian cystectomy, cystoscopy here for postoperative visit. Pt feeling very well. Has not required pain medication in 1 week. Tolerating regular diet, ambulating, voiding, normal BM. Denies vaginal bleeding. Has been walking a lot. \par \par Pathology: benign fibrotic ovarian stroma, ovarian cyst, inactive endometrium, adenomyosis and leiomyomata with necrosis, benign fallopian tubes \par \par Intraoperative and pathology findings reviewed with pt, all questions answered.

## 2021-12-13 NOTE — PHYSICAL EXAM
[Chaperone Present] : A chaperone was present in the examining room during all aspects of the physical examination [FreeTextEntry1] : BRUNA Brock  [Appropriately responsive] : appropriately responsive [Alert] : alert [No Acute Distress] : no acute distress [No Lymphadenopathy] : no lymphadenopathy [Soft] : soft [Non-tender] : non-tender [Non-distended] : non-distended [No HSM] : No HSM [No Lesions] : no lesions [No Mass] : no mass [Oriented x3] : oriented x3 [FreeTextEntry7] : incisions c/d/i, no erythema/drainage

## 2022-01-10 ENCOUNTER — APPOINTMENT (OUTPATIENT)
Dept: OBGYN | Facility: CLINIC | Age: 47
End: 2022-01-10
Payer: MEDICARE

## 2022-01-10 VITALS
WEIGHT: 180 LBS | DIASTOLIC BLOOD PRESSURE: 78 MMHG | SYSTOLIC BLOOD PRESSURE: 110 MMHG | BODY MASS INDEX: 29.99 KG/M2 | HEIGHT: 65 IN

## 2022-01-10 DIAGNOSIS — Z48.89 ENCOUNTER FOR OTHER SPECIFIED SURGICAL AFTERCARE: ICD-10-CM

## 2022-01-10 PROCEDURE — 99024 POSTOP FOLLOW-UP VISIT: CPT

## 2022-01-10 NOTE — DISCUSSION/SUMMARY
[FreeTextEntry1] : 45 yo s/p robot-assisted laparoscopic supracervical hysterectomy, bilateral salpingectomy, left ovarian cystectomy, lysis of adhesions doing well postoperatively. \par -Can resume all activities\par -Resume usual GYN care with Dr. Butler

## 2022-01-10 NOTE — PHYSICAL EXAM
[Chaperone Present] : A chaperone was present in the examining room during all aspects of the physical examination [FreeTextEntry1] : BRUNA Brock  [Appropriately responsive] : appropriately responsive [Alert] : alert [No Acute Distress] : no acute distress [No Lymphadenopathy] : no lymphadenopathy [Soft] : soft [Non-tender] : non-tender [Non-distended] : non-distended [No HSM] : No HSM [No Lesions] : no lesions [No Mass] : no mass [Oriented x3] : oriented x3 [FreeTextEntry7] : incisions well-healed, no erythema [Labia Majora] : normal [Labia Minora] : normal [Normal] : normal [Anteversion] : anteverted [Uterine Adnexae] : normal

## 2022-01-10 NOTE — HISTORY OF PRESENT ILLNESS
[FreeTextEntry1] : 47 yo s/p robot-assisted laparoscopic supracervical hysterectomy, bilateral salpingectomy, left ovarian cystectomy, lysis of adhesions here for postoperative visit. Pt feeling very well. Denies pain. Tolerating regular diet, ambulating, voiding, normal BM. Denies vaginal bleeding. Has continued to do a lot of walking. \par \par

## 2022-01-10 NOTE — LETTER GREETING
[Dear  ___] : Dear  [unfilled], [FreeTextEntry1] : I had the pleasure of evaluating your patient, ANNETTE ABDI, for her 6 week postoperative visit. She underwent a robot-assisted laparoscopic supracervical hysterectomy, bilateral salplingectomy, left ovarian cystectomy and lysis of adhesions 11/30/21. She has done very well  postoperatively. Please see my summary of recommendations followed by my note from today's visit. I have instructed her to resume her usual gynecologic care with you. \par \par Thank you for allowing me to participate in the care of this patient. If you have any questions, please do not hesitate to contact me.\par \par Sincerely,\par \par Debra Manzano MD, FACOG \par [facility]\par Obstetrics and Gynecology in [place]\[address], [facility] [address]\[place] [address]\[facility] Phone: [phone] Fax: [phone]

## 2022-09-20 NOTE — DISCHARGE NOTE ADULT - CARE PROVIDER_API CALL
Additional Area 3 Location: lower lip Ion Womack), Urology  [address], [place] [address]  Phone: [phone]  Fax: [phone]

## 2023-06-06 NOTE — ED PROVIDER NOTE - CROS ED MUSC ALL NEG
From: Carlos Eduardo Rodriguez  To: Nick Degroot  Sent: 6/6/2023 8:23 AM CDT  Subject: INR    I did not take warfarin for the last 2 days, my INR today was 3.1    negative...

## 2025-06-26 NOTE — PATIENT PROFILE ADULT - NSPROPTRIGHTREPNAME_GEN_A__NUR
--DO NOT REPLY - Sent from PACT - If sent to wrong pool, reroute to P ECO Reroute pool --    Message Type:  Refill Medication   Is the medication pended:Yes  Medication name:  tolterodine (DETROL LA) 4 MG 24 hr capsule   Message: Patient is currently OUT of her medication and will be going on a trip. Patient doesn't want to be without her medication.   Preferred pharmacy verified, and selected.   [facility]/pharmacy #[id number] - [place], [place] - [facility] [address] AT [facility]   Call Back #: [phone]  Can a detailed message be left?  Yes - LiveWell Message  and Yes - Voicemail   Is the patient OUT of Medication?  Yes: Working Hours: route as HIGH priority according to KB. Patient has been advised the message will be reviewed within 1 business day  
Sudha Lyn